# Patient Record
Sex: MALE | Race: BLACK OR AFRICAN AMERICAN | NOT HISPANIC OR LATINO | Employment: FULL TIME | URBAN - METROPOLITAN AREA
[De-identification: names, ages, dates, MRNs, and addresses within clinical notes are randomized per-mention and may not be internally consistent; named-entity substitution may affect disease eponyms.]

---

## 2023-04-14 ENCOUNTER — OFFICE VISIT (OUTPATIENT)
Dept: PRIMARY CARE | Facility: CLINIC | Age: 33
End: 2023-04-14
Payer: COMMERCIAL

## 2023-04-14 VITALS
HEART RATE: 56 BPM | BODY MASS INDEX: 33.99 KG/M2 | TEMPERATURE: 97.6 F | SYSTOLIC BLOOD PRESSURE: 138 MMHG | WEIGHT: 315 LBS | OXYGEN SATURATION: 99 % | DIASTOLIC BLOOD PRESSURE: 74 MMHG

## 2023-04-14 DIAGNOSIS — R23.3 PETECHIAE: Primary | ICD-10-CM

## 2023-04-14 DIAGNOSIS — L21.9 SEBORRHEIC DERMATITIS: ICD-10-CM

## 2023-04-14 PROCEDURE — 1036F TOBACCO NON-USER: CPT | Performed by: FAMILY MEDICINE

## 2023-04-14 PROCEDURE — 99213 OFFICE O/P EST LOW 20 MIN: CPT | Performed by: FAMILY MEDICINE

## 2023-04-14 RX ORDER — KETOCONAZOLE 20 MG/G
CREAM TOPICAL 2 TIMES DAILY
Qty: 60 G | Refills: 2 | Status: SHIPPED | OUTPATIENT
Start: 2023-04-14 | End: 2024-01-25 | Stop reason: ALTCHOICE

## 2023-04-14 ASSESSMENT — PATIENT HEALTH QUESTIONNAIRE - PHQ9
2. FEELING DOWN, DEPRESSED OR HOPELESS: NOT AT ALL
1. LITTLE INTEREST OR PLEASURE IN DOING THINGS: NOT AT ALL
SUM OF ALL RESPONSES TO PHQ9 QUESTIONS 1 AND 2: 0

## 2023-04-14 NOTE — PROGRESS NOTES
Chief Complaint   Patient presents with    Rash     Uday Castaneda is a 32 y.o. male who presents for evaluation of a rash involving the face. Rash started a few days ago. Lesions are red, and raised in texture. Rash has not changed over time. Rash causes no discomfort. Denies itching. Associated symptoms: none. Patient denies: fever, headache, nausea, and vomiting. Patient has not had contacts with similar rash. Patient has not had new exposures (soaps, lotions, laundry detergents, foods, medications, plants, insects or animals).    History reviewed. No pertinent past medical history.  Patient Active Problem List    Diagnosis Date Noted    Petechiae 04/14/2023    Seborrheic dermatitis 04/14/2023     Past Surgical History:   Procedure Laterality Date    OTHER SURGICAL HISTORY  08/09/2021    No history of surgery     No family history on file.    Social History     Socioeconomic History    Marital status: Single     Spouse name: None    Number of children: None    Years of education: None    Highest education level: None   Occupational History    None   Tobacco Use    Smoking status: Never     Passive exposure: Never    Smokeless tobacco: Never   Vaping Use    Vaping status: None   Substance and Sexual Activity    Alcohol use: None     Comment: rarely    Drug use: Yes     Frequency: 7.0 times per week     Types: Marijuana     Comment: daily    Sexual activity: None   Other Topics Concern    None   Social History Narrative    None     Social Determinants of Health     Financial Resource Strain: Not on file   Food Insecurity: Not on file   Transportation Needs: Not on file   Physical Activity: Not on file   Stress: Not on file   Social Connections: Not on file   Intimate Partner Violence: Not on file   Housing Stability: Not on file     No current outpatient medications on file.     No current facility-administered medications for this visit.     No current outpatient medications on file prior to visit.     No current  facility-administered medications on file prior to visit.     No Known Allergies     Review of Systems - General ROS: negative for - fatigue, fever, malaise, night sweats or weight loss  ENT ROS: negative for - hearing change, nasal discharge, oral lesions, sinus pain, sore throat, tinnitus or vertigo  Allergy and Immunology ROS: negative for - hives, nasal congestion or seasonal allergies  Respiratory ROS: negative for - cough, hemoptysis, pleuritic pain, shortness of breath or wheezing  Cardiovascular ROS: no chest pain or dyspnea on exertion, palpitations, PND or orthopnea.  No syncope.  Gastrointestinal ROS: no abdominal pain, change in bowel habits, or black or bloody stools  Musculoskeletal ROS: negative for - joint pain, joint stiffness, joint swelling, muscle pain or muscular weakness  Neurological ROS: negative for - dizziness, gait disturbance, headaches, impaired coordination/balance, numbness/tingling, tremors or visual changes      Blood pressure 138/74, pulse 56, temperature 36.4 °C (97.6 °F), temperature source Temporal, weight 144 kg (317 lb 3.2 oz), SpO2 99 %.    Physical Examination: General appearance - alert, well appearing, and in no distress  HEENT EOMI, PEERLA, normal eyelids.  Oropharynx no erythema or exudate.  Normal tonsils.    Ears -external auditory canal normal bilaterally.  Tympanic membranes normal bilaterally.  Mouth - mucous membranes moist, pharynx normal without lesions  Neck - supple, trachea central no thyromegaly.  No significant adenopathy  Chest -good air entry bilaterally, no rhonchi rales and no wheezes.  Heart -normal S1 and S2, regular rate and rhythm with no murmurs gallop or rub.  Abdomen -nondistended, soft, nontender with no guarding, no palpable mass and no CVA tenderness.  Bowel sounds normal.  No hernia.  Neurological - alert, oriented, cranial nerves II through XII normal.  Reflexes 2+ bilaterally.  No focal deficit.  Musculoskeletal - no joint tenderness,  deformity or swelling  Extremities: peripheral pulses normal, no clubbing or cyanosis.  Skin revealed ecchymotic rash with papules on the face.  He also has flaky skin with exfoliation and seborrhea involving the scalp.    Legacy Encounter on 12/29/2021   Component Date Value Ref Range Status    SARS-COV-2 RESULT 12/29/2021 DETECTED (A)  Not Detected Final    Comment: .  This assay is designed to detect the N, ORF1ab and/or S genes of SARS-CoV-2   via nucleic acid amplification.  A Negative (NOT DETECTED) result does not   preclude 2019-nCoV infection since the adequacy of sample collection and/or   low viral burden may result in presence of viral nucleic acids below the   clinical sensitivity of this test method.  Negative (NOT DETECTED) result   should not be used as the sole basis for treatment or other patient   management decisions. Rather negative results should be combined with   clinical observations, patient history, and epidemiological information to   make patient management decisions.    Fact sheet for providers: https://www.fda.gov/media/433311/download  Fact sheet for patients: https://www.fda.gov/media/694145/download    This test has received FDA Emergency Use Authorization (EUA) and has been   verified by [facility] ([facility]). This test   is only authorized for the duration of time that circumstances                            exist to   justify the authorization of the emergency use of in vitro diagnostic tests   for the detection of SARS-CoV-2 virus and/or diagnosis of COVID-19 infection   under section 564(b)(1) of the Act, 21 U.S.C. 360bbb-3(b)(1), unless the   authorization is terminated or revoked sooner.      [facility] is certified under CLIA-88 as   qualified to perform high complexity testing. Testing is performed in the   [facility] laboratories located at [address] [place] [address].         Problem List Items  Addressed This Visit       Petechiae - Primary    Seborrheic dermatitis     Scribe Attestation  By signing my name below, I, Jhon Clarke   attest that this documentation has been prepared under the direction and in the presence of Tez Sawyer MD.

## 2023-04-15 NOTE — ASSESSMENT & PLAN NOTE
He was reassured regarding the facial petechia followed extreme retching while he was brushing his teeth.  He was advised to avoid retching as these may result in further fascia particular.  He will notify us if there is any recurrence.

## 2023-08-02 ENCOUNTER — TELEPHONE (OUTPATIENT)
Dept: PRIMARY CARE | Facility: CLINIC | Age: 33
End: 2023-08-02

## 2023-08-02 ENCOUNTER — TELEPHONE (OUTPATIENT)
Dept: PRIMARY CARE | Facility: CLINIC | Age: 33
End: 2023-08-02
Payer: COMMERCIAL

## 2023-08-02 DIAGNOSIS — J20.9 ACUTE BRONCHITIS, UNSPECIFIED ORGANISM: Primary | ICD-10-CM

## 2023-08-02 RX ORDER — BENZONATATE 200 MG/1
200 CAPSULE ORAL 3 TIMES DAILY PRN
Qty: 30 CAPSULE | Refills: 0 | Status: SHIPPED | OUTPATIENT
Start: 2023-08-02 | End: 2024-01-25 | Stop reason: ALTCHOICE

## 2023-08-02 RX ORDER — AZITHROMYCIN 250 MG/1
TABLET, FILM COATED ORAL
Qty: 6 TABLET | Refills: 0 | Status: SHIPPED | OUTPATIENT
Start: 2023-08-02 | End: 2024-01-25 | Stop reason: ALTCHOICE

## 2023-08-02 NOTE — TELEPHONE ENCOUNTER
Patient called in complaining of a productive cough with yellowish phlegm and congestion. Onset was 4 days ago. He has tried OTC medications like mucinex dm, Nyquil, and tylenol cold and flu. He is getting minimal relief with these medications. Patient would like to know if he needs an antibiotic at this point.    Please advise

## 2023-12-18 ENCOUNTER — TELEMEDICINE (OUTPATIENT)
Dept: PRIMARY CARE | Facility: CLINIC | Age: 33
End: 2023-12-18
Payer: COMMERCIAL

## 2023-12-18 VITALS — WEIGHT: 315 LBS | BODY MASS INDEX: 33.86 KG/M2

## 2023-12-18 DIAGNOSIS — J00 NASOPHARYNGITIS: Primary | ICD-10-CM

## 2023-12-18 DIAGNOSIS — J34.89 NASAL CONGESTION WITH RHINORRHEA: ICD-10-CM

## 2023-12-18 DIAGNOSIS — R09.81 NASAL CONGESTION WITH RHINORRHEA: ICD-10-CM

## 2023-12-18 DIAGNOSIS — R09.82 POST-NASAL DRIP: ICD-10-CM

## 2023-12-18 PROCEDURE — 99442 PR PHYS/QHP TELEPHONE EVALUATION 11-20 MIN: CPT | Performed by: NURSE PRACTITIONER

## 2023-12-18 RX ORDER — AMOXICILLIN 875 MG/1
875 TABLET, FILM COATED ORAL 2 TIMES DAILY
Qty: 14 TABLET | Refills: 0 | Status: SHIPPED | OUTPATIENT
Start: 2023-12-18 | End: 2023-12-25

## 2023-12-18 NOTE — LETTER
December 22, 2023     Patient: Uday Castaneda   YOB: 1990   Date of Visit: 12/18/2023       To Whom It May Concern:    Uday Castaneda was seen in my clinic on 12/18/2023 at 3:00 pm. Please excuse Uday for his absence from work on this day to make the appointment. Uday may return to work on 12/26/2023.    If you have any questions or concerns, please don't hesitate to call.         Sincerely,         Bhanu Sweet, ALBERTO-CNP

## 2023-12-18 NOTE — PATIENT INSTRUCTIONS
DISCHARGE SUMMARY:   Diagnosis, treatment, treatment options, and possible complications of today's illness discussed and explained to patient. Patient to take medication/s associated with this visit. Patient may take OTC decongestant of choice as needed. Patient may also take OTC analgesic/antipyretic if needed for pain/fever. Advised to increase oral fluid intake. Advised steam inhalation if needed to relieve congestion. Advised warm saline gargle if needed to relieve throat discomfort. Advised Listerine antiseptic mouthwash gargle TID. Patient may use Cepacol oral spray as needed to relieve throat discomfort. Patient was advised to discard the old toothbrush and use a new toothbrush beginning on the third of antibiotics. Advised to come back if with worsening or persistent symptoms. Patient verbalized understanding of plan of care.    Patient to come back in 7 - 10 days if needed for worsening symptoms.

## 2023-12-18 NOTE — LETTER
December 18, 2023     Patient: Uday Castaneda   YOB: 1990   Date of Visit: 12/18/2023       To Whom It May Concern:    Uday Castaneda was seen in my clinic on 12/18/2023 at 3:00 pm. Please excuse Uday for his absence from work on this day to make the appointment. Uday may return to work on 12/21/2023.    If you have any questions or concerns, please don't hesitate to call.         Sincerely,         Bhanu Sweet, ALBERTO-CNP

## 2023-12-18 NOTE — PROGRESS NOTES
Subjective   Patient ID: Uday Castaneda is a 33 y.o. male who is with a chief complaint of symptoms of respiratory tract infection.     HPI  Patient is a 33 y.o. male who CONSULTED AT [facility] - PHONE ONLY today. Patient is with complaints of nasal congestion, nasal discharge, headache / sinus pain, post nasal drip, fatigue, loss of appetite, chills and fever. He denies having sore throat, cough, muscle ache, loss of sense of taste, loss of sense of smell, nor diarrhea. Patient states that present condition started about 3 days ago after being exposed to his coworker who is having similar symptoms. he denies shortness of breath, chest pain, palpitations, nor edema. he stated that he  tried OTC medications which afforded only slight relief of symptoms. he denies nausea, vomiting, abdominal pain, nor any other symptoms.    Patient states he had his COVID vaccine.  Patient states he had the flu shot for this season.    Review of Systems  General: no weight loss, generally healthy, (+) fatigue  Head: (+) headaches / sinus pain, no vertigo, no injury  Eyes: no diplopia, no tearing, no pain,   Ears: no change in hearing, no tinnitus, no bleeding, no vertigo  Mouth:  no dental difficulties, no gingival bleeding, no sore throat, no loss of sense of taste, (+) post nasal drip,   Nose: (+) congestion, (+) discharge, no bleeding, no obstruction, no loss of sense of smell  Neck: no stiffness, no pain, no tenderness, no masses, no bruit  Pulmonary: no dyspnea, no wheezing, no hemoptysis, no cough  Cardiovascular: no chest pain, no palpitations, no syncope, no orthopnea  Gastrointestinal: (+) loss of appetite, no dysphagia, no abdominal pains, no diarrhea, no emesis, no melena  Genito Urinary: no dysuria, no urinary urgency, no nocturia, no incontinence, no change in nature of urine  Musculoskeletal: no muscle ache, no joint pain, no limitation of range of motion, no paresthesia, no  numbness  Constitutional: (+) fever, (+) chills, no night sweats    Objective   NO VITAL SIGNS TAKEN FOR THIS PATIENT (VIRTUAL VISIT CONSULT - PHONE ONLY)    Physical Exam  PHYSICAL EXAMINATION WAS NOT DONE FOR THIS PATIENT (VIRTUAL VISIT CONSULT - PHONE ONLY)    Assessment/Plan   Problem List Items Addressed This Visit    None  Visit Diagnoses         Codes    Nasopharyngitis    -  Primary J00    Relevant Medications    amoxicillin (Amoxil) 875 mg tablet    Nasal congestion with rhinorrhea     R09.81, J34.89    Relevant Medications    amoxicillin (Amoxil) 875 mg tablet    Post-nasal drip     R09.82    Relevant Medications    amoxicillin (Amoxil) 875 mg tablet        DISCHARGE SUMMARY:   Diagnosis, treatment, treatment options, and possible complications of today's illness discussed and explained to patient. Patient to take medication/s associated with this visit. Patient may take OTC decongestant of choice as needed. Patient may also take OTC analgesic/antipyretic if needed for pain/fever. Advised to increase oral fluid intake. Advised steam inhalation if needed to relieve congestion. Advised warm saline gargle if needed to relieve throat discomfort. Advised Listerine antiseptic mouthwash gargle TID. Patient may use Cepacol oral spray as needed to relieve throat discomfort. Patient was advised to discard the old toothbrush and use a new toothbrush beginning on the third of antibiotics. Advised to come back if with worsening or persistent symptoms. Patient verbalized understanding of plan of care.    Patient to come back in 7 - 10 days if needed for worsening symptoms.           RAISA Wise 12/18/23 3:31 PM

## 2024-01-25 ENCOUNTER — OFFICE VISIT (OUTPATIENT)
Dept: PRIMARY CARE | Facility: CLINIC | Age: 34
End: 2024-01-25
Payer: COMMERCIAL

## 2024-01-25 VITALS
DIASTOLIC BLOOD PRESSURE: 80 MMHG | BODY MASS INDEX: 36.45 KG/M2 | WEIGHT: 315 LBS | HEART RATE: 52 BPM | HEIGHT: 78 IN | OXYGEN SATURATION: 96 % | RESPIRATION RATE: 18 BRPM | SYSTOLIC BLOOD PRESSURE: 132 MMHG | TEMPERATURE: 97.9 F

## 2024-01-25 DIAGNOSIS — G47.30 SLEEP APNEA, UNSPECIFIED TYPE: Primary | ICD-10-CM

## 2024-01-25 DIAGNOSIS — M25.462 EFFUSION OF LEFT KNEE: ICD-10-CM

## 2024-01-25 DIAGNOSIS — G47.33 OBSTRUCTIVE SLEEP APNEA SYNDROME: ICD-10-CM

## 2024-01-25 DIAGNOSIS — S83.8X2S: ICD-10-CM

## 2024-01-25 DIAGNOSIS — M23.92 INTERNAL DERANGEMENT OF LEFT KNEE: ICD-10-CM

## 2024-01-25 PROBLEM — S83.8X2A: Status: ACTIVE | Noted: 2024-01-25

## 2024-01-25 PROCEDURE — 99213 OFFICE O/P EST LOW 20 MIN: CPT | Performed by: FAMILY MEDICINE

## 2024-01-25 PROCEDURE — 1036F TOBACCO NON-USER: CPT | Performed by: FAMILY MEDICINE

## 2024-01-25 NOTE — PROGRESS NOTES
Subjective   Patient ID: Uday Castanead is a 33 y.o. male who presents for Knee Pain and Sleep Apnea.    He presents for a sleep evaluation. He complains of snoring, periods of not breathing, excessive daytime sleepiness, feels sleepy during the day, take naps during the day. Symptoms began more than a year ago, and have gradually worsened. Previous evaluation and treatment has included none.    Knee Pain  Patient presents with knee pain involving the left knee. Onset of the symptoms was several months ago. Inciting event: twisting injury while at work. Current symptoms include crepitus sensation, giving out, locking, popping sensation, stiffness, and swelling. Pain is aggravated by any weight bearing, going up and down stairs, pivoting, and standing. Patient has had no prior knee problems. Treatment to date: Physical therapy, avoidance of offending activity, brace which is not very effective, ice, OTC analgesics which are not very effective, and rest.                          Review of Systems   Constitutional:  Negative for chills and fever.   HENT:  Negative for congestion, ear pain, nosebleeds, rhinorrhea and sore throat.    Respiratory:  Negative for cough, shortness of breath and wheezing.    Cardiovascular:  Negative for chest pain, palpitations and leg swelling.   Gastrointestinal:  Negative for abdominal pain, constipation, diarrhea and vomiting.   Genitourinary:  Negative for dysuria, frequency and hematuria.   Neurological:  Negative for dizziness, tremors, numbness and headaches.       Objective   /80 (BP Location: Left arm, Patient Position: Sitting)   Pulse 52   Temp 36.6 °C (97.9 °F)   Resp 18   Ht 2.134 m (7')   Wt 148 kg (327 lb)   SpO2 96%   BMI 32.58 kg/m²     Physical Exam  Constitutional:       General: He is not in acute distress.     Appearance: Normal appearance.   HENT:      Head: Normocephalic and atraumatic.      Mouth/Throat:      Mouth: Mucous membranes are moist.       Pharynx: Oropharynx is clear. No oropharyngeal exudate or posterior oropharyngeal erythema.   Eyes:      General: No scleral icterus.     Extraocular Movements: Extraocular movements intact.      Pupils: Pupils are equal, round, and reactive to light.   Cardiovascular:      Rate and Rhythm: Normal rate and regular rhythm.      Pulses: Normal pulses.      Heart sounds: No murmur heard.     No friction rub. No gallop.   Pulmonary:      Effort: Pulmonary effort is normal.      Breath sounds: No wheezing, rhonchi or rales.   Musculoskeletal:      Left knee: Swelling, deformity, effusion, bony tenderness and crepitus present. No erythema. Decreased range of motion. Tenderness present over the medial joint line, lateral joint line and patellar tendon. ACL laxity present.      Instability Tests: Anterior drawer test negative. Posterior drawer test negative. Anterior Lachman test negative. Medial Steve test positive and lateral Steve test positive.   Skin:     General: Skin is warm.      Coloration: Skin is not jaundiced or pale.      Findings: No erythema or rash.   Neurological:      General: No focal deficit present.      Mental Status: He is alert and oriented to person, place, and time.      Cranial Nerves: No cranial nerve deficit.      Sensory: No sensory deficit.      Coordination: Coordination normal.      Gait: Gait normal.         Assessment/Plan   Problem List Items Addressed This Visit       Obstructive sleep apnea syndrome - Primary     We will order Home Sleep Study for further evaluation.         Effusion of left knee     Natural history and expected course discussed. Questions answered.  Plain film x-rays.         Relevant Orders    XR knee left 3 views (Completed)    Current tear of semilunar cartilage of left knee     He had physical therapy on several occasions with no significant improvement in his symptoms.  Therefore we will order MRI of the knee for evaluation for the meniscal tear and other  internal derangements.  Natural history and expected course discussed. Questions answered.         Relevant Orders    XR knee left 3 views (Completed)    Internal derangement of left knee     He had physical therapy on several occasions with no significant improvement in his symptoms.  Therefore we will order MRI of the knee for evaluation for the meniscal tear and other internal derangements.  Natural history and expected course discussed. Questions answered.  Plain film x-rays.         Relevant Orders    XR knee left 3 views (Completed)     Scribe Attestation  By signing my name below, IOskar Scribe   attest that this documentation has been prepared under the direction and in the presence of Tez Sawyer MD.

## 2024-01-25 NOTE — ASSESSMENT & PLAN NOTE
He had physical therapy on several occasions with no significant improvement in his symptoms.  Therefore we will order MRI of the knee for evaluation for the meniscal tear and other internal derangements.  Natural history and expected course discussed. Questions answered.

## 2024-01-25 NOTE — ASSESSMENT & PLAN NOTE
He had physical therapy on several occasions with no significant improvement in his symptoms.  Therefore we will order MRI of the knee for evaluation for the meniscal tear and other internal derangements.  Natural history and expected course discussed. Questions answered.  Plain film x-rays.

## 2024-01-26 ENCOUNTER — HOSPITAL ENCOUNTER (OUTPATIENT)
Dept: RADIOLOGY | Facility: CLINIC | Age: 34
Discharge: HOME | End: 2024-01-26
Payer: COMMERCIAL

## 2024-01-26 DIAGNOSIS — M23.92 INTERNAL DERANGEMENT OF LEFT KNEE: ICD-10-CM

## 2024-01-26 DIAGNOSIS — S83.8X2S: ICD-10-CM

## 2024-01-26 DIAGNOSIS — M25.462 EFFUSION OF LEFT KNEE: ICD-10-CM

## 2024-01-26 PROCEDURE — 73562 X-RAY EXAM OF KNEE 3: CPT | Mod: LT

## 2024-01-26 PROCEDURE — 73562 X-RAY EXAM OF KNEE 3: CPT | Mod: LEFT SIDE | Performed by: RADIOLOGY

## 2024-01-27 PROBLEM — G47.33 OBSTRUCTIVE SLEEP APNEA SYNDROME: Status: ACTIVE | Noted: 2024-01-25

## 2024-01-27 ASSESSMENT — ENCOUNTER SYMPTOMS
TREMORS: 0
FREQUENCY: 0
VOMITING: 0
PALPITATIONS: 0
FEVER: 0
WHEEZING: 0
HEADACHES: 0
ABDOMINAL PAIN: 0
CHILLS: 0
DYSURIA: 0
SORE THROAT: 0
HEMATURIA: 0
DIARRHEA: 0
NUMBNESS: 0
DIZZINESS: 0
SHORTNESS OF BREATH: 0
COUGH: 0
RHINORRHEA: 0
CONSTIPATION: 0

## 2024-03-07 DIAGNOSIS — G47.33 OBSTRUCTIVE SLEEP APNEA SYNDROME: ICD-10-CM

## 2024-04-09 ENCOUNTER — TELEMEDICINE (OUTPATIENT)
Dept: PRIMARY CARE | Facility: CLINIC | Age: 34
End: 2024-04-09
Payer: COMMERCIAL

## 2024-04-09 DIAGNOSIS — J34.89 NASAL CONGESTION WITH RHINORRHEA: ICD-10-CM

## 2024-04-09 DIAGNOSIS — R05.9 COUGH IN ADULT PATIENT: ICD-10-CM

## 2024-04-09 DIAGNOSIS — R19.7 DIARRHEA, UNSPECIFIED TYPE: ICD-10-CM

## 2024-04-09 DIAGNOSIS — R09.81 NASAL CONGESTION WITH RHINORRHEA: ICD-10-CM

## 2024-04-09 DIAGNOSIS — J00 NASOPHARYNGITIS: Primary | ICD-10-CM

## 2024-04-09 PROCEDURE — 99442 PR PHYS/QHP TELEPHONE EVALUATION 11-20 MIN: CPT | Performed by: NURSE PRACTITIONER

## 2024-04-09 RX ORDER — LOPERAMIDE HCL 2 MG
2 TABLET ORAL 4 TIMES DAILY PRN
Qty: 20 TABLET | Refills: 0 | Status: SHIPPED | OUTPATIENT
Start: 2024-04-09 | End: 2024-04-14

## 2024-04-09 RX ORDER — BROMPHENIRAMINE MALEATE, PSEUDOEPHEDRINE HYDROCHLORIDE, AND DEXTROMETHORPHAN HYDROBROMIDE 2; 30; 10 MG/5ML; MG/5ML; MG/5ML
5 SYRUP ORAL 4 TIMES DAILY PRN
Qty: 120 ML | Refills: 1 | Status: SHIPPED | OUTPATIENT
Start: 2024-04-09 | End: 2024-04-19

## 2024-04-09 RX ORDER — AMOXICILLIN 875 MG/1
875 TABLET, FILM COATED ORAL 2 TIMES DAILY
Qty: 14 TABLET | Refills: 0 | Status: SHIPPED | OUTPATIENT
Start: 2024-04-09 | End: 2024-04-16

## 2024-04-09 NOTE — PROGRESS NOTES
Subjective   Patient ID: Uday Castaneda is a 33 y.o. male who is with a chief complaint of symptoms of respiratory tract infection.     HPI  Patient is a 33 y.o. male who CONSULTED AT [facility] - PHONE ONLY today. Patient is with complaints of nasal congestion, nasal discharge, productive cough, fatigue, muscle aches, some loss of taste, intermittent diarrhea, chills, and fever. He denies having headache / sinus pain, sore throat, nor loss of sense of smell. Patient states that present condition started about 5 days ago. Patient denies history of recent travel, exposure to person/people who tested positive for COVID 19, nor exposure to person/people with flu like symptoms. he denies shortness of breath, chest pain, palpitations, nor edema. he stated that he  tried OTC medications which afforded only slight relief of symptoms. he denies nausea, vomiting, abdominal pain, nor any other symptoms.    Patient states he had not received any COVID vaccine yet.  Patient states he had the flu shot for this season.    Patient states he underwent testing for COVID yesterday and the result was NEGATIVE.    Review of Systems  General: no weight loss, generally healthy, (+) fatigue  Head:  no headaches / sinus pain, no vertigo, no injury  Eyes: no diplopia, no tearing, no pain,   Ears: no change in hearing, no tinnitus, no bleeding, no vertigo  Mouth:  no dental difficulties, no gingival bleeding, no sore throat, (+) mild loss of sense of taste  Nose: (+) congestion, (+) discharge, no bleeding, no obstruction, no loss of sense of smell  Neck: no stiffness, no pain, no tenderness, no masses, no bruit  Pulmonary: no dyspnea, no wheezing, no hemoptysis, (+) productive cough  Cardiovascular: no chest pain, no palpitations, no syncope, no orthopnea  Gastrointestinal: no change in appetite, no dysphagia, no abdominal pains, (+) intermittent diarrhea, no emesis, no melena  Genito Urinary: no dysuria, no urinary  urgency, no nocturia, no incontinence, no change in nature of urine  Musculoskeletal: (+) muscle ache, no joint pain, no limitation of range of motion, no paresthesia, no numbness  Constitutional: (+) fever, (+) chills, no night sweats    Objective   NO VITAL SIGNS TAKEN FOR THIS PATIENT (VIRTUAL VISIT CONSULT - PHONE ONLY)    Physical Exam  PHYSICAL EXAMINATION WAS NOT DONE FOR THIS PATIENT (VIRTUAL VISIT CONSULT - PHONE ONLY)    Assessment/Plan   Problem List Items Addressed This Visit    None  Visit Diagnoses         Codes    Nasopharyngitis    -  Primary J00    Relevant Medications    amoxicillin (Amoxil) 875 mg tablet    brompheniramine-pseudoeph-DM 2-30-10 mg/5 mL syrup    Cough in adult patient     R05.9    Relevant Medications    amoxicillin (Amoxil) 875 mg tablet    brompheniramine-pseudoeph-DM 2-30-10 mg/5 mL syrup    Nasal congestion with rhinorrhea     R09.81, J34.89    Relevant Medications    amoxicillin (Amoxil) 875 mg tablet    brompheniramine-pseudoeph-DM 2-30-10 mg/5 mL syrup    Diarrhea, unspecified type     R19.7    Relevant Medications    loperamide (Imodium A-D) 2 mg tablet        DISCHARGE SUMMARY:   Patient was seen and examined. Diagnosis, treatment, treatment options, and possible complications of today's illness discussed and explained to patient. Patient to take medication/s associated with this visit. Patient may also take OTC analgesic/antipyretic if needed for pain/fever. Advised to increase oral fluid intake. Advised steam inhalation if needed to relieve congestion. Advised warm saline gargle if needed to relieve throat discomfort. Advised Listerine antiseptic mouthwash gargle TID. Patient may use Cepacol oral spray as needed to relieve throat discomfort. Patient was advised to discard the old toothbrush and use a new toothbrush beginning on the third of antibiotics. he was educated and advised on low fiber, BRAT diet. Advised to avoid high fiber foods. Advised to increase fluid intake  (water and electrolyte drinks) as tolerated, if with no nausea, nor vomiting. Patient educated on indications for stool examination. Advised to come back if with worsening or persistent symptoms. Patient verbalized understanding of plan of care.    Patient to come back in 7 - 10 days if needed for worsening symptoms.         ALBERTO Wise-CNP 04/09/24 12:56 PM

## 2024-04-09 NOTE — PROGRESS NOTES
Pt presents today for sinus congestion,cough,nausea,loose stools,and a fever of 102.0.Pt states he had an old bottle of Amoxicillin that he started taking.Pt symptoms started on 04/05/2024.

## 2024-04-09 NOTE — PATIENT INSTRUCTIONS
DISCHARGE SUMMARY:   Patient was seen and examined. Diagnosis, treatment, treatment options, and possible complications of today's illness discussed and explained to patient. Patient to take medication/s associated with this visit. Patient may also take OTC analgesic/antipyretic if needed for pain/fever. Advised to increase oral fluid intake. Advised steam inhalation if needed to relieve congestion. Advised warm saline gargle if needed to relieve throat discomfort. Advised Listerine antiseptic mouthwash gargle TID. Patient may use Cepacol oral spray as needed to relieve throat discomfort. Patient was advised to discard the old toothbrush and use a new toothbrush beginning on the third of antibiotics. he was educated and advised on low fiber, BRAT diet. Advised to avoid high fiber foods. Advised to increase fluid intake (water and electrolyte drinks) as tolerated, if with no nausea, nor vomiting. Patient educated on indications for stool examination. Advised to come back if with worsening or persistent symptoms. Patient verbalized understanding of plan of care.    Patient to come back in 7 - 10 days if needed for worsening symptoms.

## 2024-07-30 ENCOUNTER — TELEPHONE (OUTPATIENT)
Dept: PRIMARY CARE | Facility: CLINIC | Age: 34
End: 2024-07-30
Payer: COMMERCIAL

## 2024-07-30 NOTE — TELEPHONE ENCOUNTER
Rachel states patient is experiencing Left knee swelling, pain, difficulty bearing weight, trouble going up and down stairs patient has tried taking naproxen over the counter with no relief, and is wanting to know what the next step is. Rachel is also asking if [facility] can fill out Patients FMLA paperwork.       Please advise and contact rachel.

## 2024-08-08 ENCOUNTER — APPOINTMENT (OUTPATIENT)
Dept: PRIMARY CARE | Facility: CLINIC | Age: 34
End: 2024-08-08
Payer: COMMERCIAL

## 2024-08-08 VITALS
HEIGHT: 78 IN | HEART RATE: 63 BPM | DIASTOLIC BLOOD PRESSURE: 74 MMHG | TEMPERATURE: 97.8 F | OXYGEN SATURATION: 97 % | RESPIRATION RATE: 14 BRPM | SYSTOLIC BLOOD PRESSURE: 128 MMHG | WEIGHT: 315 LBS | BODY MASS INDEX: 36.45 KG/M2

## 2024-08-08 DIAGNOSIS — M23.204 OLD TEAR OF MEDIAL MENISCUS OF LEFT KNEE, UNSPECIFIED TEAR TYPE: ICD-10-CM

## 2024-08-08 DIAGNOSIS — M25.462 EFFUSION OF LEFT KNEE: Primary | ICD-10-CM

## 2024-08-08 DIAGNOSIS — M25.362 INSTABILITY OF LEFT KNEE JOINT: ICD-10-CM

## 2024-08-08 DIAGNOSIS — G47.33 OBSTRUCTIVE SLEEP APNEA SYNDROME: ICD-10-CM

## 2024-08-08 PROCEDURE — 3008F BODY MASS INDEX DOCD: CPT | Performed by: FAMILY MEDICINE

## 2024-08-08 PROCEDURE — 1036F TOBACCO NON-USER: CPT | Performed by: FAMILY MEDICINE

## 2024-08-08 PROCEDURE — 99213 OFFICE O/P EST LOW 20 MIN: CPT | Performed by: FAMILY MEDICINE

## 2024-08-08 ASSESSMENT — PATIENT HEALTH QUESTIONNAIRE - PHQ9
2. FEELING DOWN, DEPRESSED OR HOPELESS: NOT AT ALL
SUM OF ALL RESPONSES TO PHQ9 QUESTIONS 1 AND 2: 0
1. LITTLE INTEREST OR PLEASURE IN DOING THINGS: NOT AT ALL

## 2024-08-08 ASSESSMENT — ENCOUNTER SYMPTOMS
HEADACHES: 0
TINGLING: 1
DIZZINESS: 0
MUSCLE WEAKNESS: 1
TREMORS: 0
FREQUENCY: 0
RHINORRHEA: 0
SHORTNESS OF BREATH: 0
HEMATURIA: 0
COUGH: 0
WHEEZING: 0
VOMITING: 0
FEVER: 0
DIARRHEA: 0
PALPITATIONS: 0
CONSTIPATION: 0
CHILLS: 0
SORE THROAT: 0
INABILITY TO BEAR WEIGHT: 1
FATIGUE: 1
NUMBNESS: 0
DYSURIA: 0
ABDOMINAL PAIN: 0

## 2024-08-08 NOTE — PROGRESS NOTES
Subjective   Patient ID: Uday Castaneda is a 33 y.o. male who presents for Knee Pain and Sleep Apnea.    He presents for follow up on obstructive sleep apnea. Symptoms have been improving. He has no periods of not breathing, decreased memory, decreased concentration, or excessive daytime sleepiness while using the CPAP machine. He has been trying to use his CPAP machine nightly but has difficulty in keeping it on through the course of the night while he is asleep. He states he feels better in the morbning after using it.    Knee Pain   The pain is present in the left knee. The quality of the pain is described as shooting, stabbing and aching. The pain is at a severity of 7/10. The pain is moderate. The pain has been Intermittent since onset. Associated symptoms include an inability to bear weight, muscle weakness and tingling. Pertinent negatives include no numbness. He reports no foreign bodies present. The symptoms are aggravated by movement and weight bearing. He has tried elevation, ice, rest, NSAIDs and heat for the symptoms. The treatment provided mild relief.      Review of Systems   Constitutional:  Positive for fatigue. Negative for chills and fever.   HENT:  Negative for congestion, ear pain, nosebleeds, rhinorrhea and sore throat.    Respiratory:  Negative for cough, shortness of breath and wheezing.    Cardiovascular:  Negative for chest pain, palpitations and leg swelling.   Gastrointestinal:  Negative for abdominal pain, constipation, diarrhea and vomiting.   Genitourinary:  Negative for dysuria, frequency and hematuria.   Neurological:  Positive for tingling. Negative for dizziness, tremors, numbness and headaches.       Objective   /74   Pulse 63   Temp 36.6 °C (97.8 °F)   Resp 14   Ht 2.134 m (7')   Wt 145 kg (319 lb)   SpO2 97%   BMI 31.79 kg/m²     Physical Exam  Constitutional:       General: He is not in acute distress.     Appearance: Normal appearance.   HENT:      Head:  Normocephalic and atraumatic.      Mouth/Throat:      Mouth: Mucous membranes are moist.      Pharynx: Oropharynx is clear. No oropharyngeal exudate or posterior oropharyngeal erythema.   Eyes:      General: No scleral icterus.     Extraocular Movements: Extraocular movements intact.      Pupils: Pupils are equal, round, and reactive to light.   Cardiovascular:      Rate and Rhythm: Normal rate and regular rhythm.      Pulses: Normal pulses.      Heart sounds: No murmur heard.     No friction rub. No gallop.   Pulmonary:      Effort: Pulmonary effort is normal.      Breath sounds: No wheezing, rhonchi or rales.   Musculoskeletal:      Left knee: Swelling, effusion and crepitus present. No erythema, ecchymosis or bony tenderness. Decreased range of motion. Tenderness present over the medial joint line, MCL and patellar tendon. MCL laxity present. No ACL laxity or PCL laxity.Abnormal alignment and abnormal meniscus. Normal pulse.      Instability Tests: Anterior drawer test negative. Posterior drawer test negative. Anterior Lachman test negative. Medial Steve test positive. Lateral Steve test negative.   Skin:     General: Skin is warm.      Coloration: Skin is not jaundiced or pale.      Findings: No erythema or rash.   Neurological:      General: No focal deficit present.      Mental Status: He is alert and oriented to person, place, and time.      Cranial Nerves: No cranial nerve deficit.      Sensory: No sensory deficit.      Coordination: Coordination normal.      Gait: Gait normal.         Assessment/Plan   Problem List Items Addressed This Visit       Obstructive sleep apnea syndrome     He is benefiting from using his CPAP machine and we will recommend that He continue using it nightly.          Effusion of left knee - Primary     We will order MRI for further evaluation.         Relevant Orders    MR knee left wo IV contrast    Instability of left knee joint     We will order MRI for further  evaluation         Relevant Orders    MR knee left wo IV contrast    Old tear of medial meniscus of left knee     We will order MRI for further evaluation.         Relevant Orders    MR knee left wo IV contrast     Scribe Attestation  By signing my name below, I, Jhon Clarke   attest that this documentation has been prepared under the direction and in the presence of Tez Sawyer MD.

## 2024-08-08 NOTE — ASSESSMENT & PLAN NOTE
He is benefiting from using his CPAP machine and we will recommend that He continue using it nightly.

## 2024-09-09 ENCOUNTER — HOSPITAL ENCOUNTER (OUTPATIENT)
Dept: RADIOLOGY | Facility: HOSPITAL | Age: 34
Discharge: HOME | End: 2024-09-09
Payer: COMMERCIAL

## 2024-09-09 DIAGNOSIS — M23.204 OLD TEAR OF MEDIAL MENISCUS OF LEFT KNEE, UNSPECIFIED TEAR TYPE: ICD-10-CM

## 2024-09-09 DIAGNOSIS — M25.362 INSTABILITY OF LEFT KNEE JOINT: ICD-10-CM

## 2024-09-09 DIAGNOSIS — M25.462 EFFUSION OF LEFT KNEE: ICD-10-CM

## 2024-09-09 PROCEDURE — 73721 MRI JNT OF LWR EXTRE W/O DYE: CPT | Mod: LT

## 2024-09-09 PROCEDURE — 73721 MRI JNT OF LWR EXTRE W/O DYE: CPT | Mod: LEFT SIDE | Performed by: RADIOLOGY

## 2024-09-10 DIAGNOSIS — M25.562 CHRONIC PAIN OF LEFT KNEE: ICD-10-CM

## 2024-09-10 DIAGNOSIS — M23.42 LOOSE BODY IN KNEE, LEFT KNEE: ICD-10-CM

## 2024-09-10 DIAGNOSIS — G89.29 CHRONIC PAIN OF LEFT KNEE: ICD-10-CM

## 2024-09-20 ENCOUNTER — APPOINTMENT (OUTPATIENT)
Dept: ORTHOPEDIC SURGERY | Facility: CLINIC | Age: 34
End: 2024-09-20
Payer: COMMERCIAL

## 2024-09-20 DIAGNOSIS — G89.29 CHRONIC PAIN OF LEFT KNEE: ICD-10-CM

## 2024-09-20 DIAGNOSIS — M23.42 LOOSE BODY IN KNEE, LEFT KNEE: ICD-10-CM

## 2024-09-20 DIAGNOSIS — M25.562 CHRONIC PAIN OF LEFT KNEE: ICD-10-CM

## 2024-09-20 PROCEDURE — 20610 DRAIN/INJ JOINT/BURSA W/O US: CPT | Performed by: STUDENT IN AN ORGANIZED HEALTH CARE EDUCATION/TRAINING PROGRAM

## 2024-09-20 PROCEDURE — 99203 OFFICE O/P NEW LOW 30 MIN: CPT | Performed by: STUDENT IN AN ORGANIZED HEALTH CARE EDUCATION/TRAINING PROGRAM

## 2024-09-20 PROCEDURE — 1036F TOBACCO NON-USER: CPT | Performed by: STUDENT IN AN ORGANIZED HEALTH CARE EDUCATION/TRAINING PROGRAM

## 2024-09-20 RX ORDER — TRIAMCINOLONE ACETONIDE 40 MG/ML
40 INJECTION, SUSPENSION INTRA-ARTICULAR; INTRAMUSCULAR
Status: COMPLETED | OUTPATIENT
Start: 2024-09-20 | End: 2024-09-20

## 2024-09-20 RX ORDER — LIDOCAINE HYDROCHLORIDE 10 MG/ML
4 INJECTION, SOLUTION INFILTRATION; PERINEURAL
Status: COMPLETED | OUTPATIENT
Start: 2024-09-20 | End: 2024-09-20

## 2024-09-20 ASSESSMENT — PAIN SCALES - GENERAL: PAINLEVEL_OUTOF10: 4

## 2024-09-20 ASSESSMENT — PAIN - FUNCTIONAL ASSESSMENT: PAIN_FUNCTIONAL_ASSESSMENT: 0-10

## 2024-09-20 NOTE — PROGRESS NOTES
Chief Complaint   Patient presents with    Left Knee - Pain     Loose Body, MRI 8/8/24       HPI  33-year-old male presents today for evaluation of his left knee.  He has attempted activity modifications anti-inflammatories with little to no relief.  He has been followed by Dr. Sawyer who did get an MRI.  Presents today for discussion of these results.  Enjoys playing video games for fun.  Does not recall any history of arthritis within the family.  No recent history of trauma to the knee.    History reviewed. No pertinent past medical history.    Past Surgical History:   Procedure Laterality Date    OTHER SURGICAL HISTORY  08/09/2021    No history of surgery        No Known Allergies     Physical exam    General: Alert and oriented to place, person, and time.  No acute distress and breathing comfortably; pleasant and cooperative with the examination.  HEENT: Head is normocephalic and atraumatic.  Neck: Supple, no visible swelling.  Cardiovascular: Good perfusion to the affected extremity.  Lungs: No audible wheezing or labored breathing.  Abdomen: Nondistended  HEME/Lymph : No visible abnormalities bilateral lower extremity    Extremity:  Left knee: Some crepitus with range of motion of the knee  Skin healthy and intact  No gross swelling or ecchymosis  No significant varus or valgus malalignment  Effusion: Mild     ROM:  Full flexion   Full extension  No pain with internal rotation of the hip  Tenderness to palpation: Medial lateral joint line     No laxity to valgus stress  No laxity to varus stress  Negative Lachman´s test  Negative anterior drawer test  Negative posterior drawer test  Positive Steve´s test     Neurovascular exam normal distally    Diagnostics:  MR knee left wo IV contrast    Result Date: 9/9/2024  Interpreted By:  Hilario Harrison, STUDY: MR KNEE LEFT WO IV CONTRAST; ;  9/9/2024 4:22 pm   INDICATION: Signs/Symptoms:left knee effusion, left knee instability, chronic medial meniscal  tear. Chronic left knee pain   COMPARISON: Plain film examination of 01/26/2024   ACCESSION NUMBER(S): [id number]   ORDERING CLINICIAN: LOUIE GREEN   TECHNIQUE: Multiplanar and multisequential MR images of the left knee are performed.   FINDINGS: There is a small joint effusion. There are multiple intermediate and low signal loose bodies in the posterior medial joint capsule adjacent to the lateral femoral condyle. There is an elongated intermediate signal loose body measuring 14 x 5 x 3 mm in diameter. There are 2 adjacent round low signal foci measuring 7 and 5 mm in maximum diameter.   There are mild osteoarthritic changes with irregular focal loss of the articular cartilage involving the posterior weight-bearing surface of the medial femoral condyle. The segment of cartilage loss measures 1.6 x 1.0 cm in diameter with variable areas of thickness. There is some thinning and irregularity of the subchondral bone plate though no ossific defect. There is some fissuring of the patellar articular cartilage at the lateral facet and mid patella which is likely full-thickness. There is focal full-thickness loss of the articular cartilage at the lateral femoral trochlea measuring 9 x 13 mm in transverse and AP diameter. There is no sign of acute fracture or osseous mass.   The anterior and posterior cruciate ligaments, lateral collateral ligament complex, popliteus tendon, medial collateral ligament, extensor complex, and patellar retinacula are intact.   The lateral meniscus is of normal morphology. There is no linear tear or truncation.   The medial meniscus is of normal morphology. There is no linear tear or truncation.   There is some lateral tracking of the patella. There is focal fluid and edema in Hoffa's fat between the lateral margin of the proximal infrapatellar tendon and lateral femoral condyle.       Mild changes of osteoarthritis with focal areas of articular cartilage loss involving the lateral femoral  trochlea, lateral patellar facet, and posterior weight-bearing surface of the medial femoral condyle. There are no ossific components.   Small joint effusion with at least 3 intermediate and low signal loose bodies within the posterolateral joint capsule adjacent to the lateral femoral condyle. The elongated intermediate signal loose body likely reflects an articular cartilage fragment.   Lateral tracking of the patella with findings of patellar tendon/lateral condylar friction.   No sign of meniscal tear.   No sign of acute ligament disruption.     MACRO: None   Signed by: Hilario Harrison 9/9/2024 5:04 PM Dictation workstation:   [id number]       Procedure:  L Inj/Asp: L knee on 9/20/2024 4:08 PM  Indications: pain  Details: 22 G needle, anterolateral approach  Medications: 40 mg triamcinolone acetonide 40 mg/mL; 4 mL lidocaine 10 mg/mL (1 %)  Consent was given by the patient. Immediately prior to procedure a time out was called to verify the correct patient, procedure, equipment, support staff and site/side marked as required. Patient was prepped and draped in the usual sterile fashion.           Assessment:  33-year-old male with multiple loose bodies about the knee, moderate knee arthritis    Treatment plan:  The natural history of the condition and its associated treatment alternatives including surgical and nonsurgical options were discussed with the patient at length.  MRI findings discussed with patient in detail today risk benefits alternative treatment discussed with him as well.  Using shared deformed tissue making does wish to proceed with a intra-articular injection to see if this provides him any relief.  He will follow-up in 2 months time  Discussed that he would likely benefit from diagnostic knee arthroscopy loose body removal however wants to forego this at this point in time we will continue to discuss this  I discussed risks and benefits of corticosteroid injection and the patient would  like to proceed.  Discussed risks of skin depigmentation and the rare but possible intravascular injection of local anesthetic which can cause palpitations or arrhythmias. I discussed the possibility of a transient increase in blood sugar. I explained that the local anesthetic tends to work immediately, it may take 2-3 days for the full effect of the corticosteroid compound. Consent was obtained and side confirmed by the patient.      All of the patient's questions were answered.

## 2024-09-24 ENCOUNTER — APPOINTMENT (OUTPATIENT)
Dept: RADIOLOGY | Facility: HOSPITAL | Age: 34
End: 2024-09-24
Payer: MEDICARE

## 2024-09-24 ENCOUNTER — HOSPITAL ENCOUNTER (EMERGENCY)
Facility: HOSPITAL | Age: 34
Discharge: HOME | End: 2024-09-24
Attending: INTERNAL MEDICINE
Payer: MEDICARE

## 2024-09-24 VITALS
DIASTOLIC BLOOD PRESSURE: 85 MMHG | RESPIRATION RATE: 18 BRPM | SYSTOLIC BLOOD PRESSURE: 153 MMHG | WEIGHT: 290 LBS | BODY MASS INDEX: 33.55 KG/M2 | HEART RATE: 57 BPM | HEIGHT: 78 IN | TEMPERATURE: 97.2 F | OXYGEN SATURATION: 100 %
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
## 2024-09-24 DIAGNOSIS — M25.562 ACUTE PAIN OF LEFT KNEE: ICD-10-CM

## 2024-09-24 PROCEDURE — 71045 X-RAY EXAM CHEST 1 VIEW: CPT

## 2024-09-24 PROCEDURE — 73564 X-RAY EXAM KNEE 4 OR MORE: CPT | Mod: LEFT SIDE | Performed by: RADIOLOGY

## 2024-09-24 PROCEDURE — 71045 X-RAY EXAM CHEST 1 VIEW: CPT | Performed by: RADIOLOGY

## 2024-09-24 PROCEDURE — 99284 EMERGENCY DEPT VISIT MOD MDM: CPT

## 2024-09-24 PROCEDURE — 73564 X-RAY EXAM KNEE 4 OR MORE: CPT | Mod: LT

## 2024-09-24 RX ORDER — MELOXICAM 15 MG/1
15 TABLET ORAL DAILY
Qty: 10 TABLET | Refills: 0 | Status: SHIPPED | OUTPATIENT
Start: 2024-09-24 | End: 2024-10-04

## 2024-09-24 ASSESSMENT — LIFESTYLE VARIABLES
HAVE PEOPLE ANNOYED YOU BY CRITICIZING YOUR DRINKING: NO
EVER HAD A DRINK FIRST THING IN THE MORNING TO STEADY YOUR NERVES TO GET RID OF A HANGOVER: NO
TOTAL SCORE: 0
HAVE YOU EVER FELT YOU SHOULD CUT DOWN ON YOUR DRINKING: NO
EVER FELT BAD OR GUILTY ABOUT YOUR DRINKING: NO

## 2024-09-24 ASSESSMENT — PAIN - FUNCTIONAL ASSESSMENT: PAIN_FUNCTIONAL_ASSESSMENT: 0-10

## 2024-09-24 ASSESSMENT — PAIN DESCRIPTION - LOCATION: LOCATION: KNEE

## 2024-09-24 ASSESSMENT — PAIN SCALES - GENERAL
PAINLEVEL_OUTOF10: 1
PAINLEVEL_OUTOF10: 0 - NO PAIN
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
## 2024-09-24 NOTE — Clinical Note
Uday Castaneda was seen and treated in our emergency department on 9/24/2024.  He may return to work on 09/26/2024.       If you have any questions or concerns, please don't hesitate to call.      Edmund Alves, DO

## 2024-09-27 ENCOUNTER — TELEMEDICINE (OUTPATIENT)
Dept: PRIMARY CARE | Facility: CLINIC | Age: 34
End: 2024-09-27
Payer: COMMERCIAL

## 2024-09-27 VITALS — BODY MASS INDEX: 33.55 KG/M2 | HEIGHT: 78 IN | WEIGHT: 290 LBS

## 2024-09-27 DIAGNOSIS — M25.562 ACUTE PAIN OF LEFT KNEE: ICD-10-CM
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
## 2024-09-27 DIAGNOSIS — S39.012D STRAIN OF LUMBAR REGION, SUBSEQUENT ENCOUNTER: ICD-10-CM

## 2024-09-27 DIAGNOSIS — S16.1XXD NECK STRAIN, SUBSEQUENT ENCOUNTER: ICD-10-CM

## 2024-09-27 PROBLEM — S39.012A STRAIN OF LUMBAR REGION: Status: ACTIVE | Noted: 2024-09-27

## 2024-09-27 RX ORDER — OXYCODONE AND ACETAMINOPHEN 5; 325 MG/1; MG/1
1 TABLET ORAL EVERY 6 HOURS PRN
Qty: 28 TABLET | Refills: 0 | Status: SHIPPED | OUTPATIENT
Start: 2024-09-27 | End: 2024-10-04

## 2024-09-27 RX ORDER — TIZANIDINE 4 MG/1
4 TABLET ORAL NIGHTLY PRN
Qty: 30 TABLET | Refills: 0 | Status: SHIPPED | OUTPATIENT
Start: 2024-09-27 | End: 2024-10-27

## 2024-09-27 ASSESSMENT — ENCOUNTER SYMPTOMS
BACK PAIN: 1
NECK PAIN: 1

## 2024-09-27 ASSESSMENT — PATIENT HEALTH QUESTIONNAIRE - PHQ9
SUM OF ALL RESPONSES TO PHQ9 QUESTIONS 1 AND 2: 0
1. LITTLE INTEREST OR PLEASURE IN DOING THINGS: NOT AT ALL
2. FEELING DOWN, DEPRESSED OR HOPELESS: NOT AT ALL

## 2024-09-27 NOTE — ASSESSMENT & PLAN NOTE
Natural history and expected course discussed. Questions answered.  Alternate Ice and Heat to affected area as needed for local pain relief. Follow-up if persistent or worsening symptoms otherwise when necessary.
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
## 2024-09-27 NOTE — LETTER
September 27, 2024     Patient: Uday Castaneda   YOB: 1990   Date of Visit: 9/27/2024       To Whom It May Concern:    Uday Castaneda was seen in my clinic on 9/27/2024. Please excuse Uday for his absence from work 9/25/2024- 9/27/2024.  He can return to work on 9/30/2024.    If you have any questions or concerns, please don't hesitate to call.         Sincerely,         Tez Sawyer MD        CC: No Recipients

## 2024-09-27 NOTE — ASSESSMENT & PLAN NOTE
Discussed the cervical pain, its course and treatment.  Oral opioids started per medication orders.  Muscle relaxants started per medication orders.  Alternate Ice and Heat to affected area as needed for local pain relief. Follow-up if persistent or worsening symptoms otherwise when necessary.

## 2024-09-28 ASSESSMENT — ENCOUNTER SYMPTOMS
TREMORS: 0
VOMITING: 0
DIZZINESS: 0
HEMATURIA: 0
HEADACHES: 1
DYSURIA: 0
CHILLS: 0
SORE THROAT: 0
SHORTNESS OF BREATH: 0
COUGH: 0
FEVER: 0
NUMBNESS: 1
ABDOMINAL PAIN: 0
RHINORRHEA: 0
CONSTIPATION: 0
DIARRHEA: 0
FREQUENCY: 0
WHEEZING: 0
PALPITATIONS: 0

## 2024-11-22 ENCOUNTER — APPOINTMENT (OUTPATIENT)
Dept: ORTHOPEDIC SURGERY | Facility: CLINIC | Age: 34
End: 2024-11-22
Payer: COMMERCIAL